# Patient Record
Sex: MALE | HISPANIC OR LATINO
[De-identification: names, ages, dates, MRNs, and addresses within clinical notes are randomized per-mention and may not be internally consistent; named-entity substitution may affect disease eponyms.]

---

## 2024-05-07 ENCOUNTER — NON-APPOINTMENT (OUTPATIENT)
Age: 44
End: 2024-05-07

## 2024-06-06 PROBLEM — Z00.00 ENCOUNTER FOR PREVENTIVE HEALTH EXAMINATION: Status: ACTIVE | Noted: 2024-06-06

## 2024-06-07 ENCOUNTER — NON-APPOINTMENT (OUTPATIENT)
Age: 44
End: 2024-06-07

## 2024-06-07 ENCOUNTER — APPOINTMENT (OUTPATIENT)
Dept: OTOLARYNGOLOGY | Facility: CLINIC | Age: 44
End: 2024-06-07

## 2024-06-07 VITALS
HEIGHT: 68 IN | HEART RATE: 80 BPM | BODY MASS INDEX: 29.55 KG/M2 | WEIGHT: 195 LBS | SYSTOLIC BLOOD PRESSURE: 119 MMHG | DIASTOLIC BLOOD PRESSURE: 81 MMHG

## 2024-06-07 DIAGNOSIS — Z78.9 OTHER SPECIFIED HEALTH STATUS: ICD-10-CM

## 2024-06-07 DIAGNOSIS — Z96.22 MYRINGOTOMY TUBE(S) STATUS: ICD-10-CM

## 2024-06-07 DIAGNOSIS — H65.22 CHRONIC SEROUS OTITIS MEDIA, LEFT EAR: ICD-10-CM

## 2024-06-07 DIAGNOSIS — H65.92 UNSPECIFIED NONSUPPURATIVE OTITIS MEDIA, LEFT EAR: ICD-10-CM

## 2024-06-07 DIAGNOSIS — J35.1 HYPERTROPHY OF TONSILS: ICD-10-CM

## 2024-06-07 DIAGNOSIS — H69.93 UNSPECIFIED EUSTACHIAN TUBE DISORDER, BILATERAL: ICD-10-CM

## 2024-06-07 DIAGNOSIS — J31.0 CHRONIC RHINITIS: ICD-10-CM

## 2024-06-07 DIAGNOSIS — T16.2XXA FOREIGN BODY IN LEFT EAR, INITIAL ENCOUNTER: ICD-10-CM

## 2024-06-07 DIAGNOSIS — J34.2 DEVIATED NASAL SEPTUM: ICD-10-CM

## 2024-06-07 DIAGNOSIS — K13.79 OTHER LESIONS OF ORAL MUCOSA: ICD-10-CM

## 2024-06-07 PROCEDURE — 69200 CLEAR OUTER EAR CANAL: CPT | Mod: 59,LT

## 2024-06-07 PROCEDURE — 31231 NASAL ENDOSCOPY DX: CPT | Mod: 59

## 2024-06-07 PROCEDURE — 99204 OFFICE O/P NEW MOD 45 MIN: CPT | Mod: 25

## 2024-06-07 PROCEDURE — 92557 COMPREHENSIVE HEARING TEST: CPT

## 2024-06-07 PROCEDURE — 92567 TYMPANOMETRY: CPT

## 2024-06-07 RX ORDER — ROFLUMILAST 3 MG/G
0.3 CREAM TOPICAL
Refills: 0 | Status: ACTIVE | COMMUNITY

## 2024-06-07 NOTE — ADDENDUM
[FreeTextEntry1] :  Documented by Shyam Dorantes acting as scribe for Dr. Moss on 06/07/2024. All Medical record entries made by the Scribe were at my, Dr. Moss, direction and personally dictated by me on 06/07/2024 . I have reviewed the chart and agree that the record accurately reflects my personal performance of the history, physical exam, assessment and plan. I have also personally directed, reviewed, and agreed with the discharge instructions.

## 2024-06-07 NOTE — PROCEDURE
[Recalcitrant Symptoms] : recalcitrant symptoms  [FreeTextEntry1] : Removal of Foreign Body [FreeTextEntry2] : old tube in the left ear canal [FreeTextEntry3] :  Procedure: Removal of Foreign Body: The risks, benefits, and alternatives were discussed with the patient. Left ear canal with TM tube - removed foreign body by curettage. The patient tolerated the procedure well. [FreeTextEntry6] :  Procedure: Flexible Nasal Endoscopy: Risks, benefits, and alternatives of flexible endoscopy were explained to the patient. The patient gave oral consent to proceed. The flexible scope was inserted into the right nasal cavity. Endoscopy of the inferior and middle meatus was performed. No polyp, mass, or lesion was appreciated. Olfactory cleft was clear. Spheno-ethmoid recess is clear. Nasopharynx was clear. Turbinates were without mass, but there was turbinate hypertrophy on the left. There was a deviated septum bilaterally. The procedure was repeated on the contralateral side with similar findings. -mild left turbinate hypertrophy -deviated septum left -narrowed opening of the left eustachian tube -wide open right eustachian tube opening -deviated septum right -mucus in the right nasal cavity -normal right middle meatus -clear to the nasopharynx bilaterally

## 2024-06-07 NOTE — ASSESSMENT
[FreeTextEntry1] : Reviewed and reconciled medications, allergies, PMHx, PSHx, SocHx, FMHx.  Patient presents today stating that 6-8 weeks ago the doctor at urgent care suggested that his hearing loss and otorrhea may be due to a right tympanic membrane rupture. He states that his ear feels better after using ear drops and antibiotics. He states that he had a new tube in his right ear within the past 6 months. He denies snoring. He states that he had asthma as a child. He states that he had a COVID vaccine.   Physical exam: -left ear canal: straight shanked tube in the canal -right TM: tiny t-tube anterior and superior - right up against the malleus -deviated septum right along the floor -inflamed turbinates -class 3 tonsils -uvula sits on the BOT  ENT Procedure: Removal of Foreign Body and Flexible nasal endoscopy -mild left turbinate hypertrophy -deviated septum left -narrowed opening of the left eustachian tube -wide open right eustachian tube opening -deviated septum right -mucus in the right nasal cavity -normal right middle meatus -clear to the nasopharynx bilaterally  Audio 6/7/24: -LECV AD -Type B AS -AD: Hearing WNL to mild SNHL 250-8000 Hz -AS: mild/moderate mixed loss rising to borderline -8000 Hz -100% understanding in the right ear at 65 dB and in the left ear at 75 dB  Plan:  Audio - results interpreted by Dr. Moss and reviewed with the patient. -Discussed r/b/a of eustachian tube dilation -Discussed r/b/a of potential left myringotomy with tiny t-tube if eustachian tube dilation takes too long to book or is not effective -FU after surgery

## 2024-06-07 NOTE — REVIEW OF SYSTEMS
[Hearing Loss] : hearing loss [Recurrent Ear Infections] : recurrent ear infections [Ear Drainage] : ear drainage [Negative] : Heme/Lymph

## 2024-06-07 NOTE — HISTORY OF PRESENT ILLNESS
[de-identified] : Patient presents today stating that 6-8 weeks ago the doctor at urgent care suggested that his hearing loss and otorrhea may be due to a right tympanic membrane rupture. He states that his ear feels better after using ear drops and antibiotics. He states that he had a new tube in his right ear within the past 6 months. He denies snoring. He states that he had asthma as a child. He states that he had a COVID vaccine.

## 2024-06-07 NOTE — CONSULT LETTER
[Dear  ___] : Dear  [unfilled], [Consult Letter:] : I had the pleasure of evaluating your patient, [unfilled]. [Please see my note below.] : Please see my note below. [Consult Closing:] : Thank you very much for allowing me to participate in the care of this patient.  If you have any questions, please do not hesitate to contact me. [Sincerely,] : Sincerely, [FreeTextEntry3] :  Jamarcus Moss MD FACS

## 2024-06-07 NOTE — DATA REVIEWED
[de-identified] : Audio 6/7/24: -LECV AD -Type B AS -AD: Hearing WNL to mild SNHL 250-8000 Hz -AS: mild/moderate mixed loss rising to borderline -8000 Hz -100% understanding in the right ear at 65 dB and in the left ear at 75 dB

## 2024-06-07 NOTE — PHYSICAL EXAM
[] : septum deviated to the right [Midline] : trachea located in midline position [Normal] : no rashes [Hearing Loss Right Only] : normal [Hearing Loss Left Only] : normal [FreeTextEntry9] : straight shanked tube in the canal [de-identified] : tiny t-tube anterior and superior - right up against the malleus [de-identified] : inflamed turbinates [de-identified] : class 3 [de-identified] : jennifer sits on the BOT [FreeTextEntry2] :  sinuses nontender to percussion.  sensations intact

## 2024-08-21 ENCOUNTER — APPOINTMENT (OUTPATIENT)
Dept: ORTHOPEDIC SURGERY | Facility: CLINIC | Age: 44
End: 2024-08-21

## 2024-09-03 ENCOUNTER — APPOINTMENT (OUTPATIENT)
Dept: OTOLARYNGOLOGY | Facility: CLINIC | Age: 44
End: 2024-09-03

## 2024-09-16 ENCOUNTER — APPOINTMENT (OUTPATIENT)
Dept: OTOLARYNGOLOGY | Facility: CLINIC | Age: 44
End: 2024-09-16
Payer: COMMERCIAL

## 2024-09-16 VITALS
HEART RATE: 83 BPM | SYSTOLIC BLOOD PRESSURE: 146 MMHG | BODY MASS INDEX: 32.96 KG/M2 | WEIGHT: 217.5 LBS | HEIGHT: 68 IN | DIASTOLIC BLOOD PRESSURE: 97 MMHG

## 2024-09-16 DIAGNOSIS — K13.79 OTHER LESIONS OF ORAL MUCOSA: ICD-10-CM

## 2024-09-16 DIAGNOSIS — J31.0 CHRONIC RHINITIS: ICD-10-CM

## 2024-09-16 DIAGNOSIS — J35.1 HYPERTROPHY OF TONSILS: ICD-10-CM

## 2024-09-16 DIAGNOSIS — H65.92 UNSPECIFIED NONSUPPURATIVE OTITIS MEDIA, LEFT EAR: ICD-10-CM

## 2024-09-16 DIAGNOSIS — Z96.22 MYRINGOTOMY TUBE(S) STATUS: ICD-10-CM

## 2024-09-16 DIAGNOSIS — H69.93 UNSPECIFIED EUSTACHIAN TUBE DISORDER, BILATERAL: ICD-10-CM

## 2024-09-16 DIAGNOSIS — J34.2 DEVIATED NASAL SEPTUM: ICD-10-CM

## 2024-09-16 PROCEDURE — 99214 OFFICE O/P EST MOD 30 MIN: CPT | Mod: 25

## 2024-09-16 PROCEDURE — 92567 TYMPANOMETRY: CPT

## 2024-09-16 PROCEDURE — 92557 COMPREHENSIVE HEARING TEST: CPT

## 2024-09-16 PROCEDURE — 69433 CREATE EARDRUM OPENING: CPT | Mod: LT

## 2024-09-16 RX ORDER — AMOXICILLIN AND CLAVULANATE POTASSIUM 875; 125 MG/1; 1/1
875-125 TABLET, FILM COATED ORAL
Refills: 0 | Status: ACTIVE | COMMUNITY

## 2024-09-16 RX ORDER — CIPROFLOXACIN AND DEXAMETHASONE 3; 1 MG/ML; MG/ML
0.3-0.1 SUSPENSION/ DROPS AURICULAR (OTIC) TWICE DAILY
Qty: 1 | Refills: 3 | Status: ACTIVE | COMMUNITY
Start: 2024-09-16 | End: 1900-01-01

## 2024-09-16 NOTE — HISTORY OF PRESENT ILLNESS
[de-identified] : Patient with h/o bilateral ETD, tonsillar hypertrophy, and left chronic otitis media presents today stating he has had lingering congestion following a COVID infection in August. He states that this infection has caused a clogged sensation in his ears and muffled hearing. He states that he has been taking Augmentin since Friday.

## 2024-09-16 NOTE — CONSULT LETTER
[Dear  ___] : Dear  [unfilled], [Courtesy Letter:] : I had the pleasure of seeing your patient, [unfilled], in my office today. [Please see my note below.] : Please see my note below. [Consult Closing:] : Thank you very much for allowing me to participate in the care of this patient.  If you have any questions, please do not hesitate to contact me. [Sincerely,] : Sincerely, [FreeTextEntry3] :  Jamarcus Moss MD FACS

## 2024-09-16 NOTE — ADDENDUM
[FreeTextEntry1] :  Documented by Shyam Dorantes acting as scribe for Dr. Moss on 09/16/2024. All Medical record entries made by the Scribe were at my, Dr. Moss, direction and personally dictated by me on 09/16/2024 . I have reviewed the chart and agree that the record accurately reflects my personal performance of the history, physical exam, assessment and plan. I have also personally directed, reviewed, and agreed with the discharge instructions.

## 2024-09-16 NOTE — ASSESSMENT
[FreeTextEntry1] : Reviewed and reconciled medications, allergies, PMHx, PSHx, SocHx, FMHx.  Patient with h/o bilateral ETD, tonsillar hypertrophy, and left chronic otitis media presents today stating he has had lingering congestion following a COVID infection in August. He states that this infection has caused a clogged sensation in his ears and muffled hearing. He states that he has been taking Augmentin since Friday.  Audio 6/7/24: -LECV AD -Type B AS -AD: Hearing WNL to mild SNHL 250-8000 Hz -AS: mild/moderate mixed loss rising to borderline -8000 Hz -100% understanding in the right ear at 65 dB and in the left ear at 75 dB   Physical exam: -NOSE score 15 -TNSS 2 -right ear canal: cerumen removed via curettage -left ear canal: cerumen removed via curettage -right TM: tube is in place -left TM: retracted eardrum -deviated septum right -inflamed turbinates -class 3 tonsils -type 3 oral cavity with a crowded airway -edematous uvula  Audio 9/16/24: -LECV AD -Type B Tymp AS -ETF Normal AD; CNT AS -AD: Borderline WNL - mild SNHL 250-8000 Hz -AS: moderate to mild mixed -8000 Hz -100% discrimination at 55 dB AD and at 75 dB AS  ENT Procedure: Left Myringotomy With Tiny T-Tube   Plan:  Audio - results interpreted by Dr. Moss and reviewed with the patient. -Keep the ears dry -Start Ciprodex ear drops - 4 drops in the left ear BID -FU in 2 weeks

## 2024-09-16 NOTE — PROCEDURE
[FreeTextEntry1] : Left Myringotomy With Tube [FreeTextEntry2] : fluid in the left middle ear [FreeTextEntry3] :  Procedure: Left Myringotomy With Tiny T-Tube. The risks, benefits, and alternatives of myringotomy tube placement were discussed with the patient. The external auditory canal was then evaluated under microscopy. All cerumen was removed. Topical phenol was touched to the tympanic membrane. A myringotomy knife was used to make an incision in the posterioinferior quadrant of the left tympanic membrane. Fluid suctioned clear. A myringotomy tube was then placed without difficulty. Patient tolerated procedure well. Dry ear precautions stressed. FU established.

## 2024-09-16 NOTE — PHYSICAL EXAM
[] : septum deviated to the right [Normal] : mucosa is normal [Midline] : trachea located in midline position [Hearing Loss Right Only] : normal [Hearing Loss Left Only] : normal [FreeTextEntry8] :  cerumen removed via curettage [de-identified] : tube is in place [de-identified] : retracted eardrum [de-identified] : inflamed turbinates [de-identified] : class 3 [de-identified] : type 3 oral cavity with a crowded airway. edematous uvula

## 2024-09-16 NOTE — DATA REVIEWED
[de-identified] : Audio 6/7/24: -LECV AD -Type B AS -AD: Hearing WNL to mild SNHL 250-8000 Hz -AS: mild/moderate mixed loss rising to borderline -8000 Hz -100% understanding in the right ear at 65 dB and in the left ear at 75 dB  Audio 9/16/24: -LECV AD -Type B Tymp AS -ETF Normal AD; CNT AS -AD: Borderline WNL - mild SNHL 250-8000 Hz -AS: moderate to mild mixed -8000 Hz -100% discrimination at 55 dB AD and at 75 dB AS

## 2024-10-11 ENCOUNTER — APPOINTMENT (OUTPATIENT)
Dept: OTOLARYNGOLOGY | Facility: CLINIC | Age: 44
End: 2024-10-11

## 2024-12-13 ENCOUNTER — APPOINTMENT (OUTPATIENT)
Dept: OTOLARYNGOLOGY | Facility: CLINIC | Age: 44
End: 2024-12-13

## 2024-12-27 ENCOUNTER — APPOINTMENT (OUTPATIENT)
Dept: OTOLARYNGOLOGY | Facility: CLINIC | Age: 44
End: 2024-12-27

## 2025-01-20 ENCOUNTER — APPOINTMENT (OUTPATIENT)
Dept: OTOLARYNGOLOGY | Facility: CLINIC | Age: 45
End: 2025-01-20
Payer: COMMERCIAL

## 2025-01-20 ENCOUNTER — NON-APPOINTMENT (OUTPATIENT)
Age: 45
End: 2025-01-20

## 2025-01-20 ENCOUNTER — TRANSCRIPTION ENCOUNTER (OUTPATIENT)
Age: 45
End: 2025-01-20

## 2025-01-20 VITALS
DIASTOLIC BLOOD PRESSURE: 100 MMHG | WEIGHT: 212 LBS | SYSTOLIC BLOOD PRESSURE: 151 MMHG | BODY MASS INDEX: 32.13 KG/M2 | HEART RATE: 74 BPM | HEIGHT: 68 IN

## 2025-01-20 DIAGNOSIS — J31.0 CHRONIC RHINITIS: ICD-10-CM

## 2025-01-20 DIAGNOSIS — K13.79 OTHER LESIONS OF ORAL MUCOSA: ICD-10-CM

## 2025-01-20 DIAGNOSIS — J34.2 DEVIATED NASAL SEPTUM: ICD-10-CM

## 2025-01-20 DIAGNOSIS — J34.3 HYPERTROPHY OF NASAL TURBINATES: ICD-10-CM

## 2025-01-20 DIAGNOSIS — R06.5 MOUTH BREATHING: ICD-10-CM

## 2025-01-20 DIAGNOSIS — J35.1 HYPERTROPHY OF TONSILS: ICD-10-CM

## 2025-01-20 DIAGNOSIS — Z96.22 MYRINGOTOMY TUBE(S) STATUS: ICD-10-CM

## 2025-01-20 DIAGNOSIS — J34.89 OTHER SPECIFIED DISORDERS OF NOSE AND NASAL SINUSES: ICD-10-CM

## 2025-01-20 DIAGNOSIS — H91.8X3 OTHER SPECIFIED HEARING LOSS, BILATERAL: ICD-10-CM

## 2025-01-20 PROCEDURE — 92557 COMPREHENSIVE HEARING TEST: CPT

## 2025-01-20 PROCEDURE — 99214 OFFICE O/P EST MOD 30 MIN: CPT

## 2025-01-20 PROCEDURE — 92567 TYMPANOMETRY: CPT

## 2025-01-21 ENCOUNTER — APPOINTMENT (OUTPATIENT)
Dept: INTERNAL MEDICINE | Facility: CLINIC | Age: 45
End: 2025-01-21
Payer: COMMERCIAL

## 2025-01-21 ENCOUNTER — NON-APPOINTMENT (OUTPATIENT)
Age: 45
End: 2025-01-21

## 2025-01-21 VITALS
RESPIRATION RATE: 17 BRPM | DIASTOLIC BLOOD PRESSURE: 69 MMHG | OXYGEN SATURATION: 97 % | BODY MASS INDEX: 31.07 KG/M2 | SYSTOLIC BLOOD PRESSURE: 132 MMHG | HEIGHT: 68 IN | HEART RATE: 85 BPM | WEIGHT: 205 LBS | TEMPERATURE: 97.9 F

## 2025-01-21 DIAGNOSIS — Z82.49 FAMILY HISTORY OF ISCHEMIC HEART DISEASE AND OTHER DISEASES OF THE CIRCULATORY SYSTEM: ICD-10-CM

## 2025-01-21 DIAGNOSIS — E66.09 OBESITY, CLASS 1: ICD-10-CM

## 2025-01-21 DIAGNOSIS — R53.83 OTHER FATIGUE: ICD-10-CM

## 2025-01-21 DIAGNOSIS — Z00.00 ENCOUNTER FOR GENERAL ADULT MEDICAL EXAMINATION W/OUT ABNORMAL FINDINGS: ICD-10-CM

## 2025-01-21 DIAGNOSIS — Z78.9 OTHER SPECIFIED HEALTH STATUS: ICD-10-CM

## 2025-01-21 DIAGNOSIS — Z80.6 FAMILY HISTORY OF LEUKEMIA: ICD-10-CM

## 2025-01-21 DIAGNOSIS — H65.92 UNSPECIFIED NONSUPPURATIVE OTITIS MEDIA, LEFT EAR: ICD-10-CM

## 2025-01-21 DIAGNOSIS — I10 ESSENTIAL (PRIMARY) HYPERTENSION: ICD-10-CM

## 2025-01-21 DIAGNOSIS — Z83.438 FAMILY HISTORY OF OTHER DISORDER OF LIPOPROTEIN METABOLISM AND OTHER LIPIDEMIA: ICD-10-CM

## 2025-01-21 DIAGNOSIS — T16.2XXA FOREIGN BODY IN LEFT EAR, INITIAL ENCOUNTER: ICD-10-CM

## 2025-01-21 DIAGNOSIS — J45.909 UNSPECIFIED ASTHMA, UNCOMPLICATED: ICD-10-CM

## 2025-01-21 DIAGNOSIS — E66.811 OBESITY, CLASS 1: ICD-10-CM

## 2025-01-21 PROCEDURE — 93000 ELECTROCARDIOGRAM COMPLETE: CPT

## 2025-01-21 PROCEDURE — 99204 OFFICE O/P NEW MOD 45 MIN: CPT

## 2025-01-21 PROCEDURE — G2211 COMPLEX E/M VISIT ADD ON: CPT | Mod: NC

## 2025-01-21 RX ORDER — PSYLLIUM HUSK 0.4 G
CAPSULE ORAL
Refills: 0 | Status: ACTIVE | COMMUNITY

## 2025-01-21 RX ORDER — COLD-HOT PACK
EACH MISCELLANEOUS
Refills: 0 | Status: ACTIVE | COMMUNITY

## 2025-01-21 RX ORDER — UBIDECARENONE/VIT E ACET 100MG-5
CAPSULE ORAL
Refills: 0 | Status: ACTIVE | COMMUNITY

## 2025-01-21 RX ORDER — ELECTROLYTES/DEXTROSE
SOLUTION, ORAL ORAL
Refills: 0 | Status: ACTIVE | COMMUNITY

## 2025-03-03 ENCOUNTER — TRANSCRIPTION ENCOUNTER (OUTPATIENT)
Age: 45
End: 2025-03-03

## 2025-03-05 ENCOUNTER — APPOINTMENT (OUTPATIENT)
Dept: INTERNAL MEDICINE | Facility: CLINIC | Age: 45
End: 2025-03-05
Payer: COMMERCIAL

## 2025-03-05 DIAGNOSIS — J32.9 CHRONIC SINUSITIS, UNSPECIFIED: ICD-10-CM

## 2025-03-05 PROCEDURE — 99213 OFFICE O/P EST LOW 20 MIN: CPT | Mod: 95

## 2025-03-05 RX ORDER — AZITHROMYCIN 250 MG/1
250 TABLET, FILM COATED ORAL
Qty: 1 | Refills: 0 | Status: ACTIVE | COMMUNITY
Start: 2025-03-05 | End: 1900-01-01

## 2025-03-07 ENCOUNTER — TRANSCRIPTION ENCOUNTER (OUTPATIENT)
Age: 45
End: 2025-03-07

## 2025-03-07 RX ORDER — PREDNISONE 20 MG/1
20 TABLET ORAL DAILY
Qty: 8 | Refills: 0 | Status: ACTIVE | COMMUNITY
Start: 2025-03-07 | End: 1900-01-01

## 2025-03-19 ENCOUNTER — TRANSCRIPTION ENCOUNTER (OUTPATIENT)
Age: 45
End: 2025-03-19

## 2025-03-20 ENCOUNTER — NON-APPOINTMENT (OUTPATIENT)
Age: 45
End: 2025-03-20

## 2025-03-28 ENCOUNTER — APPOINTMENT (OUTPATIENT)
Dept: INTERNAL MEDICINE | Facility: CLINIC | Age: 45
End: 2025-03-28
Payer: COMMERCIAL

## 2025-03-28 VITALS
OXYGEN SATURATION: 99 % | HEIGHT: 68 IN | HEART RATE: 82 BPM | WEIGHT: 208 LBS | RESPIRATION RATE: 16 BRPM | SYSTOLIC BLOOD PRESSURE: 121 MMHG | DIASTOLIC BLOOD PRESSURE: 77 MMHG | BODY MASS INDEX: 31.52 KG/M2 | TEMPERATURE: 97.7 F

## 2025-03-28 DIAGNOSIS — Z00.00 ENCOUNTER FOR GENERAL ADULT MEDICAL EXAMINATION W/OUT ABNORMAL FINDINGS: ICD-10-CM

## 2025-03-28 DIAGNOSIS — E66.09 OBESITY, CLASS 1: ICD-10-CM

## 2025-03-28 DIAGNOSIS — I10 ESSENTIAL (PRIMARY) HYPERTENSION: ICD-10-CM

## 2025-03-28 DIAGNOSIS — E66.811 OBESITY, CLASS 1: ICD-10-CM

## 2025-03-28 DIAGNOSIS — J34.89 OTHER SPECIFIED DISORDERS OF NOSE AND NASAL SINUSES: ICD-10-CM

## 2025-03-28 DIAGNOSIS — Z23 ENCOUNTER FOR IMMUNIZATION: ICD-10-CM

## 2025-03-28 DIAGNOSIS — Z12.11 ENCOUNTER FOR SCREENING FOR MALIGNANT NEOPLASM OF COLON: ICD-10-CM

## 2025-03-28 DIAGNOSIS — J32.9 CHRONIC SINUSITIS, UNSPECIFIED: ICD-10-CM

## 2025-03-28 DIAGNOSIS — R17 UNSPECIFIED JAUNDICE: ICD-10-CM

## 2025-03-28 PROCEDURE — 99214 OFFICE O/P EST MOD 30 MIN: CPT | Mod: 25

## 2025-03-28 PROCEDURE — 99396 PREV VISIT EST AGE 40-64: CPT

## 2025-03-28 RX ORDER — AZELASTINE HYDROCHLORIDE 137 UG/1
137 SPRAY, METERED NASAL DAILY
Qty: 1 | Refills: 1 | Status: ACTIVE | COMMUNITY
Start: 2025-03-28 | End: 1900-01-01

## 2025-04-15 ENCOUNTER — APPOINTMENT (OUTPATIENT)
Dept: OTOLARYNGOLOGY | Facility: CLINIC | Age: 45
End: 2025-04-15

## 2025-06-11 ENCOUNTER — APPOINTMENT (OUTPATIENT)
Dept: OTOLARYNGOLOGY | Facility: CLINIC | Age: 45
End: 2025-06-11

## 2025-07-01 ENCOUNTER — APPOINTMENT (OUTPATIENT)
Dept: OTOLARYNGOLOGY | Facility: CLINIC | Age: 45
End: 2025-07-01
Payer: COMMERCIAL

## 2025-07-01 VITALS
DIASTOLIC BLOOD PRESSURE: 84 MMHG | BODY MASS INDEX: 31.83 KG/M2 | WEIGHT: 210 LBS | HEIGHT: 68 IN | SYSTOLIC BLOOD PRESSURE: 125 MMHG | HEART RATE: 72 BPM

## 2025-07-01 PROBLEM — H61.21 IMPACTED CERUMEN OF RIGHT EAR: Status: ACTIVE | Noted: 2025-07-01

## 2025-07-01 PROCEDURE — 92567 TYMPANOMETRY: CPT

## 2025-07-01 PROCEDURE — 92557 COMPREHENSIVE HEARING TEST: CPT

## 2025-07-01 PROCEDURE — 99214 OFFICE O/P EST MOD 30 MIN: CPT | Mod: 25

## 2025-07-01 PROCEDURE — 69433 CREATE EARDRUM OPENING: CPT | Mod: LT

## 2025-07-02 ENCOUNTER — APPOINTMENT (OUTPATIENT)
Dept: OTOLARYNGOLOGY | Facility: CLINIC | Age: 45
End: 2025-07-02

## 2025-07-30 ENCOUNTER — APPOINTMENT (OUTPATIENT)
Dept: OTOLARYNGOLOGY | Facility: CLINIC | Age: 45
End: 2025-07-30
Payer: COMMERCIAL

## 2025-07-30 VITALS
DIASTOLIC BLOOD PRESSURE: 88 MMHG | WEIGHT: 205 LBS | HEIGHT: 68 IN | BODY MASS INDEX: 31.07 KG/M2 | HEART RATE: 67 BPM | SYSTOLIC BLOOD PRESSURE: 125 MMHG

## 2025-07-30 DIAGNOSIS — J31.0 CHRONIC RHINITIS: ICD-10-CM

## 2025-07-30 DIAGNOSIS — J35.1 HYPERTROPHY OF TONSILS: ICD-10-CM

## 2025-07-30 DIAGNOSIS — Z96.22 MYRINGOTOMY TUBE(S) STATUS: ICD-10-CM

## 2025-07-30 DIAGNOSIS — K13.79 OTHER LESIONS OF ORAL MUCOSA: ICD-10-CM

## 2025-07-30 DIAGNOSIS — H69.93 UNSPECIFIED EUSTACHIAN TUBE DISORDER, BILATERAL: ICD-10-CM

## 2025-07-30 DIAGNOSIS — J34.2 DEVIATED NASAL SEPTUM: ICD-10-CM

## 2025-07-30 PROCEDURE — 99213 OFFICE O/P EST LOW 20 MIN: CPT
